# Patient Record
Sex: MALE | Race: WHITE | NOT HISPANIC OR LATINO | Employment: FULL TIME | URBAN - METROPOLITAN AREA
[De-identification: names, ages, dates, MRNs, and addresses within clinical notes are randomized per-mention and may not be internally consistent; named-entity substitution may affect disease eponyms.]

---

## 2021-03-17 ENCOUNTER — TRANSCRIBE ORDERS (OUTPATIENT)
Dept: PHYSICAL THERAPY | Facility: CLINIC | Age: 41
End: 2021-03-17

## 2021-03-17 ENCOUNTER — TREATMENT (OUTPATIENT)
Dept: PHYSICAL THERAPY | Facility: CLINIC | Age: 41
End: 2021-03-17

## 2021-03-17 DIAGNOSIS — S62.611B OPEN DISPLACED FRACTURE OF PROXIMAL PHALANX OF LEFT INDEX FINGER, INITIAL ENCOUNTER: Primary | ICD-10-CM

## 2021-03-17 DIAGNOSIS — S62.611B OPEN DISPLACED FRACTURE OF PROXIMAL PHALANX OF LEFT INDEX FINGER, INITIAL ENCOUNTER: ICD-10-CM

## 2021-03-17 DIAGNOSIS — T14.8XXA: Primary | ICD-10-CM

## 2021-03-17 DIAGNOSIS — Z47.89 ORTHOPEDIC AFTERCARE: ICD-10-CM

## 2021-03-17 PROCEDURE — L3906 WHO W/O JOINTS CF: HCPCS | Performed by: PHYSICAL THERAPIST

## 2021-03-17 NOTE — PROGRESS NOTES
Kishor Oc 1980   Diagnosis/ Surgery: Left Index finger crush, Proximal phalanx open fracture. S/P CRIF              Date Of Onset: 02/25/2021    Date Of Surgery:03/5/2021    Hand Dominance: Right  History of Present Condition: Work related injury. 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 Left index finger crushed in machine. Resulting in comminuted fracture. Required 3 pins to fixate.  Sent to PT for post surgical splinting.  Medical/Vocational History/ Medications: PMH See MD notes. 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    Pain: 8-9/10 index finger    Edema: Mod + to severe index finger  Sensibility: WNL   Wound Status:volar index surgical z scar, 3 pins radial side of the index finger.  ROM/ Strength/Test: Not assessed due to fracture precautions    Splinting:  · Patient was measure and fit with a custom fabricated forearm based radial gutter splint with wrist in 20 degrees of extension, index/long finger MCP joint flexed to 80 degree, IP joint in full extension.   · Patient was instructed in wearing schedule, precautions and care of the splint during this visit.   · Patient was instructed in proper donning/doffing of splint.   Assessment:  · Patient was fitted and appropriate splint was fabricated this date.  · Patient reported that splint was comfortable and had no complications with the fit of the splint.  · Patient was instructed and patient verbalized understanding of precautions, wear and care of the splint.   · Patient demonstrated independent donning/doffing of splint during treatment today.  Goals:  · Patient was fitted properly with appropriate splint for diagnosis  · Patient was educated on precautions, wear schedule and care of splint  · Patient demonstrated independence with donning/doffing of the splint.  · Splint was provided to Protect Healing Structures, Restrict Mobility, Improve joint alignment.  Plan:  · No additional treatment is required for this  patient at this time. The patient is therefore discharged from therapy.  · Patient advised to contact therapist with any additional questions or concerns regarding the fit and function of the splint.  · Patient will be seen for splint issues as needed   · Wear Instructions: Off for hygiene       PT SIGNATURE: Seng Johnson PT, T  DATE TREATMENT INITIATED: 3/17/2021